# Patient Record
Sex: FEMALE
[De-identification: names, ages, dates, MRNs, and addresses within clinical notes are randomized per-mention and may not be internally consistent; named-entity substitution may affect disease eponyms.]

---

## 2021-04-12 ENCOUNTER — APPOINTMENT (OUTPATIENT)
Age: 26
End: 2021-04-12
Payer: COMMERCIAL

## 2021-04-12 PROCEDURE — 0001A: CPT

## 2021-05-03 ENCOUNTER — APPOINTMENT (OUTPATIENT)
Age: 26
End: 2021-05-03
Payer: COMMERCIAL

## 2021-05-03 PROCEDURE — 0002A: CPT

## 2021-07-27 ENCOUNTER — EMERGENCY (EMERGENCY)
Facility: HOSPITAL | Age: 26
LOS: 1 days | Discharge: HOME | End: 2021-07-27
Attending: EMERGENCY MEDICINE
Payer: COMMERCIAL

## 2021-07-27 VITALS
RESPIRATION RATE: 18 BRPM | SYSTOLIC BLOOD PRESSURE: 125 MMHG | HEART RATE: 111 BPM | TEMPERATURE: 99 F | DIASTOLIC BLOOD PRESSURE: 83 MMHG | WEIGHT: 112.88 LBS | OXYGEN SATURATION: 100 %

## 2021-07-27 PROCEDURE — 99053 MED SERV 10PM-8AM 24 HR FAC: CPT

## 2021-07-27 PROCEDURE — 99284 EMERGENCY DEPT VISIT MOD MDM: CPT

## 2021-07-27 NOTE — ED ADULT TRIAGE NOTE - CHIEF COMPLAINT QUOTE
pt arrived sent from  for high heart rate. went to  today for congestion, cough and shortness of breath with exertion for  2 days, tested COVID + and they noticed her heart rate was 115bpm so  sent her to ER. pt is aao x4. denies chest pain, respirations even and unlabored. no distress noted.

## 2021-07-28 VITALS — OXYGEN SATURATION: 99 % | HEART RATE: 102 BPM

## 2021-07-28 PROCEDURE — 99283 EMERGENCY DEPT VISIT LOW MDM: CPT

## 2021-07-28 RX ORDER — ACETAMINOPHEN 500 MG
650 TABLET ORAL ONCE
Refills: 0 | Status: COMPLETED | OUTPATIENT
Start: 2021-07-28 | End: 2021-07-28

## 2021-07-28 RX ADMIN — Medication 650 MILLIGRAM(S): at 00:38

## 2021-07-28 NOTE — ED PROVIDER NOTE - OBJECTIVE STATEMENT
25F no PMH [place] [facility]. Endorses bodyaches, mild cough and SOB x 2 days. [place]ed positive for [place]id [place] [facility]. Had pfizer vaccine. Was tachycardic to 113 at [facility]. Denies palpitations, CP, anosmia, travel, nausea, vomiting, diarrhea, urinary symptoms.

## 2021-07-28 NOTE — ED PROVIDER NOTE - CLINICAL SUMMARY MEDICAL DECISION MAKING FREE TEXT BOX
Patient well appearing.  Does not meet current COVID-19 testing or admission criteria. Advised home quarantine  for 14-days.  Anticipatory guidance provided and supportive care recommended. Advised immediate return if worsening symptoms, especially if short of breath. Patient provided with pulse ox.

## 2021-07-28 NOTE — ED PROVIDER NOTE - PHYSICAL EXAMINATION
Gen: Well appearing in NAD  Head: NC/AT  Neck: trachea midline  Resp:  No distress, CTAB, saturating 99% on RA  CV: tachycardic, regular rhythm  GI: soft, NTND  Ext: no deformities  Neuro:  A&O appears non focal  Skin:  Warm and dry as visualized  Psych:  Normal affect and mood

## 2021-07-29 ENCOUNTER — TRANSCRIPTION ENCOUNTER (OUTPATIENT)
Age: 26
End: 2021-07-29

## 2021-07-30 ENCOUNTER — TRANSCRIPTION ENCOUNTER (OUTPATIENT)
Age: 26
End: 2021-07-30

## 2021-08-02 ENCOUNTER — TRANSCRIPTION ENCOUNTER (OUTPATIENT)
Age: 26
End: 2021-08-02

## 2021-10-08 PROBLEM — Z00.00 ENCOUNTER FOR PREVENTIVE HEALTH EXAMINATION: Status: ACTIVE | Noted: 2021-10-08

## 2021-10-13 ENCOUNTER — APPOINTMENT (OUTPATIENT)
Dept: PULMONOLOGY | Facility: CLINIC | Age: 26
End: 2021-10-13
Payer: COMMERCIAL

## 2021-10-13 ENCOUNTER — NON-APPOINTMENT (OUTPATIENT)
Age: 26
End: 2021-10-13

## 2021-10-13 VITALS
WEIGHT: 110 LBS | DIASTOLIC BLOOD PRESSURE: 62 MMHG | OXYGEN SATURATION: 98 % | HEIGHT: 64 IN | HEART RATE: 87 BPM | BODY MASS INDEX: 18.78 KG/M2 | SYSTOLIC BLOOD PRESSURE: 102 MMHG | RESPIRATION RATE: 14 BRPM

## 2021-10-13 DIAGNOSIS — L98.9 DISORDER OF THE SKIN AND SUBCUTANEOUS TISSUE, UNSPECIFIED: ICD-10-CM

## 2021-10-13 DIAGNOSIS — U09.9 POST COVID-19 CONDITION, UNSPECIFIED: ICD-10-CM

## 2021-10-13 DIAGNOSIS — R06.02 SHORTNESS OF BREATH: ICD-10-CM

## 2021-10-13 PROCEDURE — 99203 OFFICE O/P NEW LOW 30 MIN: CPT

## 2021-10-13 RX ORDER — ALBUTEROL SULFATE 90 UG/1
108 (90 BASE) INHALANT RESPIRATORY (INHALATION)
Qty: 1 | Refills: 5 | Status: ACTIVE | COMMUNITY
Start: 2021-10-13 | End: 1900-01-01

## 2021-10-13 NOTE — PHYSICAL EXAM
[Supple] : supple [No Neck Mass] : no neck mass [No JVD] : no jvd [Normal S1, S2] : normal s1, s2 [Clear to Auscultation Bilaterally] : clear to auscultation bilaterally [No Masses] : no masses [Soft] : soft [Normal Bowel Sounds] : normal bowel sounds [Normal Gait] : normal gait [No Clubbing] : no clubbing [No Edema] : no edema [No Rash] : no rash [No Motor Deficits] : no motor deficits [Normal Insight/judgment] : normal insight/judgment [Normal Affect] : normal affect [TextBox_2] : pleasant f no distress no cough     [TextBox_11] : moist no lesions     [TextBox_54] : tachycardia  [TextBox_125] : pigmented lesions  skin

## 2021-10-13 NOTE — ASSESSMENT
[FreeTextEntry1] : 25y/o  female\par \par 1- post-covid   shortness of breath  with  exercise  ? RADS/asthma\par 2- seasonal alleriges  has dog\par 3-pre- melanoma  skin lesions multiple    s/p MoHs\par 4-tachycardia post covid\par \par Recommendations\par 1- trial albuterol  before exercise  and  as needed  q 4- 6 hrs\par 2- pft\par 3- cxr pa/lateral\par 4-follow up cardiology\par 5- f/u neurology\par 6- vaccinations  + covid  pfizer x 2   \par 7-  immunoglobulin level    spike ptn\par \par return after above\par \par understands and agrees to plan

## 2021-10-13 NOTE — REVIEW OF SYSTEMS
[Nasal Congestion] : nasal congestion [Sinus Problems] : sinus problems [Cough] : cough [Chest Tightness] : chest tightness [Sputum] : sputum [SOB on Exertion] : sob on exertion [Seasonal Allergies] : seasonal allergies [Nausea] : nausea [Vomiting] : vomiting [Back Pain] : back pain [Headache] : headache [Dizziness] : dizziness [Fever] : no fever [Fatigue] : no fatigue [Recent Wt Gain (___ Lbs)] : ~T no recent weight gain [Chills] : no chills [Recent Wt Loss (___ Lbs)] : ~T no recent weight loss [Dry Eyes] : no dry eyes [Epistaxis] : no epistaxis [Hemoptysis] : no hemoptysis [Frequent URIs] : no frequent URIs [Dyspnea] : no dyspnea [Pleuritic Pain] : no pleuritic pain [Wheezing] : no wheezing [Leg Cramps] : no leg cramps [Hay Fever] : no hay fever [GERD] : no gerd [Abdominal Pain] : no abdominal pain [Diarrhea] : no diarrhea [Constipation] : no constipation [Dysphagia] : no dysphagia [Bleeding] : no bleeding [Myalgias] : no myalgias [Chronic Pain] : no chronic pain [Rash] : no rash [Anemia] : no anemia [Blood Transfusion] : no blood transfusion [Clotting Disorder/ Frequent bleeding] : no clotting disorder/ frequent bleeding [History of Iron Deficiency] : no history of iron deficiency [Seizures] : no seizures [Memory Loss] : no memory loss [Depression] : no depression [Anxiety] : no anxiety [Panic Attacks] : no panic attacks [Diabetes] : no diabetes [Thyroid Problem] : no thyroid problem [Obesity] : no obesity [TextBox_30] : with walking  exercise      [TextBox_94] : lower back at times

## 2021-10-20 PROBLEM — Z00.00 ENCOUNTER FOR PREVENTIVE HEALTH EXAMINATION: Status: ACTIVE | Noted: 2021-10-20

## 2021-11-08 ENCOUNTER — APPOINTMENT (OUTPATIENT)
Dept: NEUROLOGY | Facility: CLINIC | Age: 26
End: 2021-11-08

## 2021-11-13 ENCOUNTER — APPOINTMENT (OUTPATIENT)
Dept: DISASTER EMERGENCY | Facility: CLINIC | Age: 26
End: 2021-11-13

## 2021-11-16 ENCOUNTER — APPOINTMENT (OUTPATIENT)
Dept: PULMONOLOGY | Facility: CLINIC | Age: 26
End: 2021-11-16

## 2021-12-22 ENCOUNTER — APPOINTMENT (OUTPATIENT)
Dept: NEUROLOGY | Facility: CLINIC | Age: 26
End: 2021-12-22

## 2023-03-10 NOTE — ED PROVIDER NOTE - PATIENT PORTAL LINK FT
Spoke with the patient an she went ahead moved her procedure to April. She had something going on in March and didn't want to not feel well for it.      Procedure - EP Study / SVT Abl possible   Date; 4/17/2023  Arrival time: 6:30 am   Procedure time: 8:00 am     Elvin updated / alerted cath lab [employer]) You can access the FollowMyHealth Patient Portal offered by [facility] by registering at the following website: [email or web address]. By joining [employer]’s FollowMyHealth portal, you will also be able to view your health information using other applications (apps) compatible with our system.